# Patient Record
Sex: MALE | Race: WHITE | URBAN - METROPOLITAN AREA
[De-identification: names, ages, dates, MRNs, and addresses within clinical notes are randomized per-mention and may not be internally consistent; named-entity substitution may affect disease eponyms.]

---

## 2019-05-20 ENCOUNTER — APPOINTMENT (RX ONLY)
Dept: URBAN - METROPOLITAN AREA CLINIC 141 | Facility: CLINIC | Age: 44
Setting detail: DERMATOLOGY
End: 2019-05-20

## 2019-05-20 DIAGNOSIS — L72.8 OTHER FOLLICULAR CYSTS OF THE SKIN AND SUBCUTANEOUS TISSUE: ICD-10-CM

## 2019-05-20 DIAGNOSIS — Z71.89 OTHER SPECIFIED COUNSELING: ICD-10-CM

## 2019-05-20 DIAGNOSIS — D69.2 OTHER NONTHROMBOCYTOPENIC PURPURA: ICD-10-CM

## 2019-05-20 DIAGNOSIS — L73.2 HIDRADENITIS SUPPURATIVA: ICD-10-CM

## 2019-05-20 DIAGNOSIS — L81.4 OTHER MELANIN HYPERPIGMENTATION: ICD-10-CM

## 2019-05-20 DIAGNOSIS — L82.1 OTHER SEBORRHEIC KERATOSIS: ICD-10-CM

## 2019-05-20 PROCEDURE — 99203 OFFICE O/P NEW LOW 30 MIN: CPT | Mod: 25

## 2019-05-20 PROCEDURE — ? INTRALESIONAL KENALOG

## 2019-05-20 PROCEDURE — 11900 INJECT SKIN LESIONS </W 7: CPT

## 2019-05-20 PROCEDURE — ? SUNSCREEN RECOMMENDATIONS

## 2019-05-20 PROCEDURE — ? TREATMENT REGIMEN

## 2019-05-20 PROCEDURE — ? PRESCRIPTION

## 2019-05-20 PROCEDURE — ? COUNSELING

## 2019-05-20 RX ORDER — SULFACETAMIDE SODIUM AND SULFUR 10; 5 MG/G; MG/G
RINSE TOPICAL
Qty: 1 | Refills: 6 | Status: ERX | COMMUNITY
Start: 2019-05-20

## 2019-05-20 RX ADMIN — SULFACETAMIDE SODIUM AND SULFUR: 10; 5 RINSE TOPICAL at 20:53

## 2019-05-20 ASSESSMENT — LOCATION SIMPLE DESCRIPTION DERM
LOCATION SIMPLE: RIGHT BUTTOCK
LOCATION SIMPLE: RIGHT UPPER ARM
LOCATION SIMPLE: RIGHT THIGH
LOCATION SIMPLE: LEFT UPPER ARM
LOCATION SIMPLE: LEFT PRETIBIAL REGION
LOCATION SIMPLE: RIGHT FOREARM
LOCATION SIMPLE: ABDOMEN
LOCATION SIMPLE: LEFT THIGH
LOCATION SIMPLE: RIGHT PRETIBIAL REGION
LOCATION SIMPLE: RIGHT FOREHEAD
LOCATION SIMPLE: LEFT LIP

## 2019-05-20 ASSESSMENT — HURLEY STAGE
IN YOUR EXPERIENCE, AMONG ALL PATIENTS YOU HAVE SEEN WITH THIS CONDITION, HOW SEVERE IS THIS PATIENT'S CONDITION?: HURLEY STAGE I: ABSCESS FORMATION (SINGLE OR MULTIPLE) WITHOUT SINUS TRACTS OR SCARRING

## 2019-05-20 ASSESSMENT — LOCATION DETAILED DESCRIPTION DERM
LOCATION DETAILED: RIGHT ANTERIOR PROXIMAL UPPER ARM
LOCATION DETAILED: PERIUMBILICAL SKIN
LOCATION DETAILED: LEFT LOWER CUTANEOUS LIP
LOCATION DETAILED: RIGHT ANTERIOR DISTAL THIGH
LOCATION DETAILED: RIGHT DISTAL DORSAL FOREARM
LOCATION DETAILED: LEFT DISTAL PRETIBIAL REGION
LOCATION DETAILED: LEFT ANTERIOR PROXIMAL UPPER ARM
LOCATION DETAILED: LEFT ANTERIOR DISTAL THIGH
LOCATION DETAILED: RIGHT BUTTOCK
LOCATION DETAILED: RIGHT INFERIOR MEDIAL FOREHEAD
LOCATION DETAILED: RIGHT DISTAL PRETIBIAL REGION

## 2019-05-20 ASSESSMENT — PAIN INTENSITY VAS: HOW INTENSE IS YOUR PAIN 0 BEING NO PAIN, 10 BEING THE MOST SEVERE PAIN POSSIBLE?: NO PAIN

## 2019-05-20 ASSESSMENT — LOCATION ZONE DERM
LOCATION ZONE: ARM
LOCATION ZONE: LIP
LOCATION ZONE: TRUNK
LOCATION ZONE: LEG
LOCATION ZONE: FACE

## 2019-05-20 ASSESSMENT — SEVERITY ASSESSMENT: SEVERITY: MODERATE

## 2019-05-20 NOTE — PROCEDURE: SUNSCREEN RECOMMENDATIONS
Detail Level: Generalized
Products Recommended: hats, sunscreen 55
General Sunscreen Counseling: I recommended a broad spectrum sunscreen with a SPF of 30 or higher.  I explained that SPF 30 sunscreens block approximately 97 percent of the sun's harmful rays.  Sunscreens should be applied at least 15 minutes prior to expected sun exposure and then every 2 hours after that as long as sun exposure continues. If swimming or exercising sunscreen should be reapplied every 45 minutes to an hour after getting wet or sweating.  One ounce, or the equivalent of a shot glass full of sunscreen, is adequate to protect the skin not covered by a bathing suit. I also recommended a lip balm with a sunscreen as well. Sun protective clothing can be used in lieu of sunscreen but must be worn the entire time you are exposed to the sun's rays.

## 2019-05-20 NOTE — PROCEDURE: TREATMENT REGIMEN
Detail Level: Detailed
Plan: if not gone in 2-3 weeks patient is to come back for biopsy. Patient is unsure if area was traumatized during yard work
verbal instruction/written material

## 2019-05-20 NOTE — PROCEDURE: INTRALESIONAL KENALOG
Detail Level: Detailed
Kenalog Preparation: Kenalog
X Size Of Lesion In Cm (Optional): 0
Lot # (Optional): [id number]
Consent: The risks of atrophy were reviewed with the patient.
Expiration Date (Optional): 09/2020
Medical Necessity Clause: This procedure was medically necessary because the lesions that were treated were:
Concentration Of Solution Injected (Mg/Ml): 2.5
Administered By (Optional): Gabriella Paulson PA-C
Total Volume Injected (Ccs- Only Use Numbers And Decimals): 0.5
Include Z78.9 (Other Specified Conditions Influencing Health Status) As An Associated Diagnosis?: No

## 2019-05-20 NOTE — HPI: EVALUATION OF SKIN LESION(S)
What Type Of Note Output Would You Prefer (Optional)?: Standard Output
How Severe Are Your Spot(S)?: mild
Have Your Spot(S) Been Treated In The Past?: has not been treated
Hpi Title: Evaluation of Skin Lesions
Year Removed: 1900
Hpi Title: Evaluation of a Skin Lesion
Year Removed: 1900